# Patient Record
Sex: MALE | Race: WHITE
[De-identification: names, ages, dates, MRNs, and addresses within clinical notes are randomized per-mention and may not be internally consistent; named-entity substitution may affect disease eponyms.]

---

## 2018-01-31 ENCOUNTER — HOSPITAL ENCOUNTER (OUTPATIENT)
Dept: HOSPITAL 89 - LAB | Age: 68
End: 2018-01-31
Attending: INTERNAL MEDICINE
Payer: COMMERCIAL

## 2018-01-31 DIAGNOSIS — I25.10: Primary | ICD-10-CM

## 2018-01-31 DIAGNOSIS — I10: ICD-10-CM

## 2018-01-31 DIAGNOSIS — M10.9: ICD-10-CM

## 2018-01-31 DIAGNOSIS — E11.9: ICD-10-CM

## 2018-01-31 DIAGNOSIS — E78.5: ICD-10-CM

## 2018-01-31 LAB
LDLC SERPL-MCNC: 40 MG/DL
PLATELET COUNT, AUTOMATED: 186 K/UL (ref 150–450)

## 2018-01-31 PROCEDURE — 84443 ASSAY THYROID STIM HORMONE: CPT

## 2018-01-31 PROCEDURE — 82435 ASSAY OF BLOOD CHLORIDE: CPT

## 2018-01-31 PROCEDURE — 82465 ASSAY BLD/SERUM CHOLESTEROL: CPT

## 2018-01-31 PROCEDURE — 84550 ASSAY OF BLOOD/URIC ACID: CPT

## 2018-01-31 PROCEDURE — 82247 BILIRUBIN TOTAL: CPT

## 2018-01-31 PROCEDURE — 82043 UR ALBUMIN QUANTITATIVE: CPT

## 2018-01-31 PROCEDURE — 84132 ASSAY OF SERUM POTASSIUM: CPT

## 2018-01-31 PROCEDURE — 82310 ASSAY OF CALCIUM: CPT

## 2018-01-31 PROCEDURE — 82565 ASSAY OF CREATININE: CPT

## 2018-01-31 PROCEDURE — 83718 ASSAY OF LIPOPROTEIN: CPT

## 2018-01-31 PROCEDURE — 84075 ASSAY ALKALINE PHOSPHATASE: CPT

## 2018-01-31 PROCEDURE — 84450 TRANSFERASE (AST) (SGOT): CPT

## 2018-01-31 PROCEDURE — 82040 ASSAY OF SERUM ALBUMIN: CPT

## 2018-01-31 PROCEDURE — 82374 ASSAY BLOOD CARBON DIOXIDE: CPT

## 2018-01-31 PROCEDURE — 36415 COLL VENOUS BLD VENIPUNCTURE: CPT

## 2018-01-31 PROCEDURE — 84155 ASSAY OF PROTEIN SERUM: CPT

## 2018-01-31 PROCEDURE — 83036 HEMOGLOBIN GLYCOSYLATED A1C: CPT

## 2018-01-31 PROCEDURE — 85025 COMPLETE CBC W/AUTO DIFF WBC: CPT

## 2018-01-31 PROCEDURE — 84460 ALANINE AMINO (ALT) (SGPT): CPT

## 2018-01-31 PROCEDURE — 84478 ASSAY OF TRIGLYCERIDES: CPT

## 2018-01-31 PROCEDURE — 82947 ASSAY GLUCOSE BLOOD QUANT: CPT

## 2018-01-31 PROCEDURE — 84295 ASSAY OF SERUM SODIUM: CPT

## 2018-01-31 PROCEDURE — 84520 ASSAY OF UREA NITROGEN: CPT

## 2019-02-18 ENCOUNTER — HOSPITAL ENCOUNTER (OUTPATIENT)
Dept: HOSPITAL 89 - LAB | Age: 69
End: 2019-02-18
Attending: INTERNAL MEDICINE
Payer: COMMERCIAL

## 2019-02-18 DIAGNOSIS — E11.9: Primary | ICD-10-CM

## 2019-02-18 DIAGNOSIS — I10: ICD-10-CM

## 2019-02-18 DIAGNOSIS — M10.9: ICD-10-CM

## 2019-02-18 DIAGNOSIS — E78.5: ICD-10-CM

## 2019-02-18 LAB
LDLC SERPL-MCNC: 43 MG/DL
PLATELET COUNT, AUTOMATED: 218 K/UL (ref 150–450)

## 2019-02-18 PROCEDURE — 82947 ASSAY GLUCOSE BLOOD QUANT: CPT

## 2019-02-18 PROCEDURE — 82040 ASSAY OF SERUM ALBUMIN: CPT

## 2019-02-18 PROCEDURE — 82043 UR ALBUMIN QUANTITATIVE: CPT

## 2019-02-18 PROCEDURE — 84460 ALANINE AMINO (ALT) (SGPT): CPT

## 2019-02-18 PROCEDURE — 84075 ASSAY ALKALINE PHOSPHATASE: CPT

## 2019-02-18 PROCEDURE — 84520 ASSAY OF UREA NITROGEN: CPT

## 2019-02-18 PROCEDURE — 84153 ASSAY OF PSA TOTAL: CPT

## 2019-02-18 PROCEDURE — 85025 COMPLETE CBC W/AUTO DIFF WBC: CPT

## 2019-02-18 PROCEDURE — 83718 ASSAY OF LIPOPROTEIN: CPT

## 2019-02-18 PROCEDURE — 82465 ASSAY BLD/SERUM CHOLESTEROL: CPT

## 2019-02-18 PROCEDURE — 36415 COLL VENOUS BLD VENIPUNCTURE: CPT

## 2019-02-18 PROCEDURE — 84450 TRANSFERASE (AST) (SGOT): CPT

## 2019-02-18 PROCEDURE — 81001 URINALYSIS AUTO W/SCOPE: CPT

## 2019-02-18 PROCEDURE — 84155 ASSAY OF PROTEIN SERUM: CPT

## 2019-02-18 PROCEDURE — 84478 ASSAY OF TRIGLYCERIDES: CPT

## 2019-02-18 PROCEDURE — 84132 ASSAY OF SERUM POTASSIUM: CPT

## 2019-02-18 PROCEDURE — 82565 ASSAY OF CREATININE: CPT

## 2019-02-18 PROCEDURE — 82374 ASSAY BLOOD CARBON DIOXIDE: CPT

## 2019-02-18 PROCEDURE — 82435 ASSAY OF BLOOD CHLORIDE: CPT

## 2019-02-18 PROCEDURE — 83036 HEMOGLOBIN GLYCOSYLATED A1C: CPT

## 2019-02-18 PROCEDURE — 84295 ASSAY OF SERUM SODIUM: CPT

## 2019-02-18 PROCEDURE — 82247 BILIRUBIN TOTAL: CPT

## 2019-02-18 PROCEDURE — 84443 ASSAY THYROID STIM HORMONE: CPT

## 2019-02-18 PROCEDURE — 82310 ASSAY OF CALCIUM: CPT

## 2019-07-31 ENCOUNTER — HOSPITAL ENCOUNTER (OUTPATIENT)
Dept: HOSPITAL 89 - CT | Age: 69
End: 2019-07-31
Attending: UROLOGY
Payer: COMMERCIAL

## 2019-07-31 DIAGNOSIS — R16.1: ICD-10-CM

## 2019-07-31 DIAGNOSIS — N40.0: Primary | ICD-10-CM

## 2019-07-31 PROCEDURE — 74176 CT ABD & PELVIS W/O CONTRAST: CPT

## 2019-07-31 NOTE — RADIOLOGY IMAGING REPORT
FACILITY: [facility] [place] 

 

PATIENT NAME: Erwin Perez

: 1950

MR: [id number]

V: [id number]

EXAM DATE: 

ORDERING PHYSICIAN: EDMUND CATHERINE

TECHNOLOGIST: 

 

Location: [facility]

Patient: Erwin Perez

: 1950

MRN: [id number]

Visit/Account:[id number]

Date of Sevice:  2019

 

ACCESSION #: [id number]

 

CT ABDOMEN PELVIS W/O CON

 

HISTORY:  kidney stones, left flank pain

 

TECHNIQUE: Axial images acquired through the abdomen/pelvis.  Coronal and sagittal reformatting also 
performed.  No IV contrast administered.Dose Lowering Technique

 

One of the following dose optimization techniques was utilized in the performance of this exam: Autom
ated exposure control; adjustment of the mA and/or kV according to the patient's size; or use of an i
terative  reconstruction technique.  Specific details can be referenced in the facility's radiology C
T exam operational policy.

 

COMPARISON:  None.

 

FINDINGS:

 

Visualized lung bases:  Negative.

 

Hepatobiliary:  Negative.

 

Spleen:  Mildly enlarged at 14 cm

 

Adrenals:  Negative.

 

Pancreas:  Negative

 

Kidneys ureters and bladder: There is moderate perinephric stranding bilaterally.  No evidence of hyd
ronephrosis or hydroureter.  There is a 1.4 cm hypodensity in the interpolar region of the left kidne
y.  The CT Hounsfield units suggest this is a simple cyst.  There is no demonstration of urolithiasis
.

 

Genitalia:  Prostate gland is moderately enlarged impinging upon the floor the bladder

 

GI:  The appendix is visualized and does not appear inflamed.  There is no evidence of bowel obstruct
ion.  There is a small hiatal hernia

 

Vessels/spaces/nodes:  There mild atherosclerotic calcifications in the abdominal aorta and branch ve
ssels

 

Bones/soft tissues:  There spondylotic changes of the thoracolumbar spine.

 

Additional findings:  None pertinent.

 

IMPRESSION:

 

No demonstration of urolithiasis, hydronephrosis or hydroureter

 

Moderate perinephric stranding bilaterally

 

1.4 cm hypodensity in the interpolar region of the left kidney likely represents a cyst by Hounsfield
 units

 

Prostate gland is moderately enlarged impinging upon the floor the bladder

 

Mild splenomegaly

 

 

 

Report Dictated By: Mandi Whitehead MD at 2019 3:14 PM

 

Report E-Signed By: Mandi Whitehead MD  at 2019 3:38 PM

 

WSN:[id number]

## 2019-08-09 ENCOUNTER — HOSPITAL ENCOUNTER (OUTPATIENT)
Dept: HOSPITAL 89 - LAB | Age: 69
End: 2019-08-09
Attending: INTERNAL MEDICINE
Payer: COMMERCIAL

## 2019-08-09 DIAGNOSIS — E11.9: ICD-10-CM

## 2019-08-09 DIAGNOSIS — I25.10: ICD-10-CM

## 2019-08-09 DIAGNOSIS — M47.817: ICD-10-CM

## 2019-08-09 DIAGNOSIS — E78.5: ICD-10-CM

## 2019-08-09 DIAGNOSIS — M54.5: Primary | ICD-10-CM

## 2019-08-09 DIAGNOSIS — I10: ICD-10-CM

## 2019-08-09 LAB
LDLC SERPL-MCNC: 40 MG/DL
PLATELET COUNT, AUTOMATED: 198 K/UL (ref 150–450)

## 2019-08-09 PROCEDURE — 84450 TRANSFERASE (AST) (SGOT): CPT

## 2019-08-09 PROCEDURE — 36415 COLL VENOUS BLD VENIPUNCTURE: CPT

## 2019-08-09 PROCEDURE — 84443 ASSAY THYROID STIM HORMONE: CPT

## 2019-08-09 PROCEDURE — 82435 ASSAY OF BLOOD CHLORIDE: CPT

## 2019-08-09 PROCEDURE — 84132 ASSAY OF SERUM POTASSIUM: CPT

## 2019-08-09 PROCEDURE — 82310 ASSAY OF CALCIUM: CPT

## 2019-08-09 PROCEDURE — 84155 ASSAY OF PROTEIN SERUM: CPT

## 2019-08-09 PROCEDURE — 82040 ASSAY OF SERUM ALBUMIN: CPT

## 2019-08-09 PROCEDURE — 85025 COMPLETE CBC W/AUTO DIFF WBC: CPT

## 2019-08-09 PROCEDURE — 81001 URINALYSIS AUTO W/SCOPE: CPT

## 2019-08-09 PROCEDURE — 83036 HEMOGLOBIN GLYCOSYLATED A1C: CPT

## 2019-08-09 PROCEDURE — 84075 ASSAY ALKALINE PHOSPHATASE: CPT

## 2019-08-09 PROCEDURE — 82374 ASSAY BLOOD CARBON DIOXIDE: CPT

## 2019-08-09 PROCEDURE — 84460 ALANINE AMINO (ALT) (SGPT): CPT

## 2019-08-09 PROCEDURE — 82247 BILIRUBIN TOTAL: CPT

## 2019-08-09 PROCEDURE — 83718 ASSAY OF LIPOPROTEIN: CPT

## 2019-08-09 PROCEDURE — 84478 ASSAY OF TRIGLYCERIDES: CPT

## 2019-08-09 PROCEDURE — 84295 ASSAY OF SERUM SODIUM: CPT

## 2019-08-09 PROCEDURE — 82947 ASSAY GLUCOSE BLOOD QUANT: CPT

## 2019-08-09 PROCEDURE — 82565 ASSAY OF CREATININE: CPT

## 2019-08-09 PROCEDURE — 82465 ASSAY BLD/SERUM CHOLESTEROL: CPT

## 2019-08-09 PROCEDURE — 84520 ASSAY OF UREA NITROGEN: CPT

## 2019-08-09 PROCEDURE — 72100 X-RAY EXAM L-S SPINE 2/3 VWS: CPT

## 2019-08-09 NOTE — RADIOLOGY IMAGING REPORT
FACILITY: [facility] 

 

PATIENT NAME: Erwin Perez

: 1950

MR: [id number]

V: [id number]

EXAM DATE: 

ORDERING PHYSICIAN: VICTORIANO PINEDA

TECHNOLOGIST: 

 

Location: [facility] [place]

Patient: Erwin Perez

: 1950

MRN: [id number]

Visit/Account:[id number]

Date of Sevice:  2019

 

ACCESSION #: [id number]

 

Exam type: L-SPINE 2 OR 3 VIEW

 

History: low back pain, no known injury

 

Comparison: None.

 

Findings:

 

There are five nonrib-bearing lumbar-type vertebral bodies present.  There is no evidence of acute fr
actures or subluxations.  There is moderate disc space narrowing at L5-S1.  There are flowing anterio
r osteophytes at T12-L1, L1-L2 and L2-L3.

 

IMPRESSION:

 

1.  Multilevel spondylotic changes as described above.  The bridging anterior osteophytes from T12 to
 L3 with very little disc space narrowing could be due to expression of diffuse idiopathic skeletal h
yperostosis.

 

Report Dictated By: Mandi Whitehead MD at 2019 3:35 PM

 

Report E-Signed By: Mandi Whitehead MD  at 2019 3:37 PM

 

WSN:[id number]